# Patient Record
Sex: MALE | Race: BLACK OR AFRICAN AMERICAN | NOT HISPANIC OR LATINO | URBAN - METROPOLITAN AREA
[De-identification: names, ages, dates, MRNs, and addresses within clinical notes are randomized per-mention and may not be internally consistent; named-entity substitution may affect disease eponyms.]

---

## 2019-03-01 ENCOUNTER — OUTPATIENT (OUTPATIENT)
Dept: OUTPATIENT SERVICES | Facility: HOSPITAL | Age: 74
LOS: 1 days | End: 2019-03-01
Payer: MEDICAID

## 2019-03-01 PROCEDURE — G9001: CPT

## 2019-03-06 ENCOUNTER — INPATIENT (INPATIENT)
Facility: HOSPITAL | Age: 74
LOS: 1 days | Discharge: HOME | End: 2019-03-08
Attending: STUDENT IN AN ORGANIZED HEALTH CARE EDUCATION/TRAINING PROGRAM | Admitting: STUDENT IN AN ORGANIZED HEALTH CARE EDUCATION/TRAINING PROGRAM
Payer: MEDICAID

## 2019-03-06 VITALS
HEART RATE: 96 BPM | RESPIRATION RATE: 18 BRPM | DIASTOLIC BLOOD PRESSURE: 98 MMHG | SYSTOLIC BLOOD PRESSURE: 177 MMHG | TEMPERATURE: 98 F

## 2019-03-06 LAB
ALBUMIN SERPL ELPH-MCNC: 4.5 G/DL — SIGNIFICANT CHANGE UP (ref 3.3–5)
ALP SERPL-CCNC: 161 U/L — HIGH (ref 40–120)
ALT FLD-CCNC: 20 U/L — SIGNIFICANT CHANGE UP (ref 4–41)
ANION GAP SERPL CALC-SCNC: 12 MMO/L — SIGNIFICANT CHANGE UP (ref 7–14)
AST SERPL-CCNC: 21 U/L — SIGNIFICANT CHANGE UP (ref 4–40)
BASOPHILS # BLD AUTO: 0.02 K/UL — SIGNIFICANT CHANGE UP (ref 0–0.2)
BASOPHILS NFR BLD AUTO: 0.3 % — SIGNIFICANT CHANGE UP (ref 0–2)
BILIRUB SERPL-MCNC: < 0.2 MG/DL — LOW (ref 0.2–1.2)
BUN SERPL-MCNC: 19 MG/DL — SIGNIFICANT CHANGE UP (ref 7–23)
CALCIUM SERPL-MCNC: 9.5 MG/DL — SIGNIFICANT CHANGE UP (ref 8.4–10.5)
CHLORIDE SERPL-SCNC: 99 MMOL/L — SIGNIFICANT CHANGE UP (ref 98–107)
CO2 SERPL-SCNC: 30 MMOL/L — SIGNIFICANT CHANGE UP (ref 22–31)
CREAT SERPL-MCNC: 1.17 MG/DL — SIGNIFICANT CHANGE UP (ref 0.5–1.3)
EOSINOPHIL # BLD AUTO: 0.05 K/UL — SIGNIFICANT CHANGE UP (ref 0–0.5)
EOSINOPHIL NFR BLD AUTO: 0.8 % — SIGNIFICANT CHANGE UP (ref 0–6)
GLUCOSE SERPL-MCNC: 118 MG/DL — HIGH (ref 70–99)
HCT VFR BLD CALC: 43.9 % — SIGNIFICANT CHANGE UP (ref 39–50)
HGB BLD-MCNC: 14.2 G/DL — SIGNIFICANT CHANGE UP (ref 13–17)
IMM GRANULOCYTES NFR BLD AUTO: 0.3 % — SIGNIFICANT CHANGE UP (ref 0–1.5)
LYMPHOCYTES # BLD AUTO: 2.55 K/UL — SIGNIFICANT CHANGE UP (ref 1–3.3)
LYMPHOCYTES # BLD AUTO: 39.4 % — SIGNIFICANT CHANGE UP (ref 13–44)
MCHC RBC-ENTMCNC: 29.8 PG — SIGNIFICANT CHANGE UP (ref 27–34)
MCHC RBC-ENTMCNC: 32.3 % — SIGNIFICANT CHANGE UP (ref 32–36)
MCV RBC AUTO: 92 FL — SIGNIFICANT CHANGE UP (ref 80–100)
MONOCYTES # BLD AUTO: 0.62 K/UL — SIGNIFICANT CHANGE UP (ref 0–0.9)
MONOCYTES NFR BLD AUTO: 9.6 % — SIGNIFICANT CHANGE UP (ref 2–14)
NEUTROPHILS # BLD AUTO: 3.21 K/UL — SIGNIFICANT CHANGE UP (ref 1.8–7.4)
NEUTROPHILS NFR BLD AUTO: 49.6 % — SIGNIFICANT CHANGE UP (ref 43–77)
NRBC # FLD: 0 K/UL — LOW (ref 25–125)
PLATELET # BLD AUTO: 250 K/UL — SIGNIFICANT CHANGE UP (ref 150–400)
PMV BLD: 10.4 FL — SIGNIFICANT CHANGE UP (ref 7–13)
POTASSIUM SERPL-MCNC: 3.8 MMOL/L — SIGNIFICANT CHANGE UP (ref 3.5–5.3)
POTASSIUM SERPL-SCNC: 3.8 MMOL/L — SIGNIFICANT CHANGE UP (ref 3.5–5.3)
PROT SERPL-MCNC: 7.6 G/DL — SIGNIFICANT CHANGE UP (ref 6–8.3)
RBC # BLD: 4.77 M/UL — SIGNIFICANT CHANGE UP (ref 4.2–5.8)
RBC # FLD: 13 % — SIGNIFICANT CHANGE UP (ref 10.3–14.5)
SODIUM SERPL-SCNC: 141 MMOL/L — SIGNIFICANT CHANGE UP (ref 135–145)
TROPONIN T, HIGH SENSITIVITY: 15 NG/L — SIGNIFICANT CHANGE UP (ref ?–14)
WBC # BLD: 6.47 K/UL — SIGNIFICANT CHANGE UP (ref 3.8–10.5)
WBC # FLD AUTO: 6.47 K/UL — SIGNIFICANT CHANGE UP (ref 3.8–10.5)

## 2019-03-06 RX ORDER — SODIUM CHLORIDE 9 MG/ML
1000 INJECTION INTRAMUSCULAR; INTRAVENOUS; SUBCUTANEOUS ONCE
Qty: 0 | Refills: 0 | Status: COMPLETED | OUTPATIENT
Start: 2019-03-06 | End: 2019-03-06

## 2019-03-06 RX ORDER — METOCLOPRAMIDE HCL 10 MG
10 TABLET ORAL ONCE
Qty: 0 | Refills: 0 | Status: COMPLETED | OUTPATIENT
Start: 2019-03-06 | End: 2019-03-06

## 2019-03-06 RX ORDER — MECLIZINE HCL 12.5 MG
25 TABLET ORAL ONCE
Qty: 0 | Refills: 0 | Status: COMPLETED | OUTPATIENT
Start: 2019-03-06 | End: 2019-03-06

## 2019-03-06 RX ADMIN — Medication 25 MILLIGRAM(S): at 22:47

## 2019-03-06 RX ADMIN — Medication 10 MILLIGRAM(S): at 22:47

## 2019-03-06 RX ADMIN — SODIUM CHLORIDE 1000 MILLILITER(S): 9 INJECTION INTRAMUSCULAR; INTRAVENOUS; SUBCUTANEOUS at 22:47

## 2019-03-06 NOTE — ED ADULT TRIAGE NOTE - CHIEF COMPLAINT QUOTE
c/o dizziness since last night. AS per daughter his BP was 216/117 around 6pm. Took Hydrochlorothiazide 25 mg this morning. denies CP or SOB or nausea or any other associated symptoms.  PMH of HTN.

## 2019-03-06 NOTE — ED PROVIDER NOTE - CLINICAL SUMMARY MEDICAL DECISION MAKING FREE TEXT BOX
Elderly pt w/ new onset headache and dizziness, story without convincing peripheral elements, will obtain bloodwork including trops, ekg, ct head, if all studies WNL will likely MRI/MRA to r/o stroke given persistence of symptoms and lack of other story elements to explain symptoms

## 2019-03-06 NOTE — ED PROVIDER NOTE - ATTENDING CONTRIBUTION TO CARE
agree with resident note  "73y m w PMhx HTN on HCTZ at home p/w headache and intermittent dizziness since yesterday. Pt report frontal b/l headache since yesterday; he then began experiencing room spinning. Initially symptoms were persistent, now better when sitting still, worse when turning to either side. denies nausea, vomiting."  No prior hx of vertigo.  States symptoms started with a headache    PE: well appearing; VSS: PERRL; CTAB/L; s1 s2 no m/r/g abd soft/NT/ND exT: no edema Neuro: no nystagmus; no skew; gait stable; FTN wnl    Imp: concern given age and description of constant vertigo (though now resolved); will get CT CTA head/neck to help r/o vertibo/basilar insufficiency

## 2019-03-06 NOTE — ED PROVIDER NOTE - OBJECTIVE STATEMENT
73y m w PMhx HTN on HCTZ at home p/w headache and intermittent dizziness since yesterday. Pt report frontal b/l headache since yesterday; he then began experiencing room spi 73y m w PMhx HTN on HCTZ at home p/w headache and intermittent dizziness since yesterday. Pt report frontal b/l headache since yesterday; he then began experiencing room spinning. Initially symptoms were persistent, now better when sitting still, worse when turning to either side. denies nausea, vomiting.

## 2019-03-06 NOTE — ED ADULT NURSE NOTE - OBJECTIVE STATEMENT
74 yo AAO4, ambulatory, brought to room 6 c/o dizziness and high BP x 2 days. states he has been feeling intermittent dizziness since last night, pt daughter took his BP and it was "very high," associated with intermittent HA, pt appears comfortable in stretcher, VSS, gait even and steady, denies CP/SOB, blurry vision/ringing in ears. 20g placed in LAC, labs drawn and sent, medicated as per MD order, awaiting further orders at this time

## 2019-03-07 DIAGNOSIS — I10 ESSENTIAL (PRIMARY) HYPERTENSION: ICD-10-CM

## 2019-03-07 DIAGNOSIS — R42 DIZZINESS AND GIDDINESS: ICD-10-CM

## 2019-03-07 DIAGNOSIS — I72.9 ANEURYSM OF UNSPECIFIED SITE: ICD-10-CM

## 2019-03-07 DIAGNOSIS — Z29.9 ENCOUNTER FOR PROPHYLACTIC MEASURES, UNSPECIFIED: ICD-10-CM

## 2019-03-07 LAB
ANION GAP SERPL CALC-SCNC: 13 MMO/L — SIGNIFICANT CHANGE UP (ref 7–14)
BUN SERPL-MCNC: 15 MG/DL — SIGNIFICANT CHANGE UP (ref 7–23)
CALCIUM SERPL-MCNC: 9 MG/DL — SIGNIFICANT CHANGE UP (ref 8.4–10.5)
CHLORIDE SERPL-SCNC: 102 MMOL/L — SIGNIFICANT CHANGE UP (ref 98–107)
CHOLEST SERPL-MCNC: 192 MG/DL — SIGNIFICANT CHANGE UP (ref 120–199)
CK MB BLD-MCNC: 3.3 NG/ML — SIGNIFICANT CHANGE UP (ref 1–6.6)
CK SERPL-CCNC: 127 U/L — SIGNIFICANT CHANGE UP (ref 30–200)
CO2 SERPL-SCNC: 28 MMOL/L — SIGNIFICANT CHANGE UP (ref 22–31)
CREAT SERPL-MCNC: 1.22 MG/DL — SIGNIFICANT CHANGE UP (ref 0.5–1.3)
GLUCOSE SERPL-MCNC: 99 MG/DL — SIGNIFICANT CHANGE UP (ref 70–99)
HCT VFR BLD CALC: 42.7 % — SIGNIFICANT CHANGE UP (ref 39–50)
HDLC SERPL-MCNC: 33 MG/DL — LOW (ref 35–55)
HGB BLD-MCNC: 13.6 G/DL — SIGNIFICANT CHANGE UP (ref 13–17)
LIPID PNL WITH DIRECT LDL SERPL: 144 MG/DL — SIGNIFICANT CHANGE UP
MAGNESIUM SERPL-MCNC: 2.1 MG/DL — SIGNIFICANT CHANGE UP (ref 1.6–2.6)
MCHC RBC-ENTMCNC: 28.9 PG — SIGNIFICANT CHANGE UP (ref 27–34)
MCHC RBC-ENTMCNC: 31.9 % — LOW (ref 32–36)
MCV RBC AUTO: 90.9 FL — SIGNIFICANT CHANGE UP (ref 80–100)
NRBC # FLD: 0 K/UL — LOW (ref 25–125)
PHOSPHATE SERPL-MCNC: 2.8 MG/DL — SIGNIFICANT CHANGE UP (ref 2.5–4.5)
PLATELET # BLD AUTO: 229 K/UL — SIGNIFICANT CHANGE UP (ref 150–400)
PMV BLD: 10.7 FL — SIGNIFICANT CHANGE UP (ref 7–13)
POTASSIUM SERPL-MCNC: 4.1 MMOL/L — SIGNIFICANT CHANGE UP (ref 3.5–5.3)
POTASSIUM SERPL-SCNC: 4.1 MMOL/L — SIGNIFICANT CHANGE UP (ref 3.5–5.3)
RBC # BLD: 4.7 M/UL — SIGNIFICANT CHANGE UP (ref 4.2–5.8)
RBC # FLD: 13.1 % — SIGNIFICANT CHANGE UP (ref 10.3–14.5)
SODIUM SERPL-SCNC: 143 MMOL/L — SIGNIFICANT CHANGE UP (ref 135–145)
TRIGL SERPL-MCNC: 133 MG/DL — SIGNIFICANT CHANGE UP (ref 10–149)
TROPONIN T, HIGH SENSITIVITY: 25 NG/L — SIGNIFICANT CHANGE UP (ref ?–14)
TSH SERPL-MCNC: 2.63 UIU/ML — SIGNIFICANT CHANGE UP (ref 0.27–4.2)
WBC # BLD: 5.34 K/UL — SIGNIFICANT CHANGE UP (ref 3.8–10.5)
WBC # FLD AUTO: 5.34 K/UL — SIGNIFICANT CHANGE UP (ref 3.8–10.5)

## 2019-03-07 PROCEDURE — 99223 1ST HOSP IP/OBS HIGH 75: CPT

## 2019-03-07 PROCEDURE — 70551 MRI BRAIN STEM W/O DYE: CPT | Mod: 26

## 2019-03-07 PROCEDURE — 70498 CT ANGIOGRAPHY NECK: CPT | Mod: 26

## 2019-03-07 PROCEDURE — 99222 1ST HOSP IP/OBS MODERATE 55: CPT | Mod: GC

## 2019-03-07 PROCEDURE — 70496 CT ANGIOGRAPHY HEAD: CPT | Mod: 26

## 2019-03-07 PROCEDURE — 12345: CPT | Mod: NC

## 2019-03-07 RX ORDER — HYDRALAZINE HCL 50 MG
5 TABLET ORAL ONCE
Qty: 0 | Refills: 0 | Status: COMPLETED | OUTPATIENT
Start: 2019-03-07 | End: 2019-03-07

## 2019-03-07 RX ORDER — MECLIZINE HCL 12.5 MG
25 TABLET ORAL EVERY 6 HOURS
Qty: 0 | Refills: 0 | Status: DISCONTINUED | OUTPATIENT
Start: 2019-03-07 | End: 2019-03-08

## 2019-03-07 RX ORDER — INFLUENZA VIRUS VACCINE 15; 15; 15; 15 UG/.5ML; UG/.5ML; UG/.5ML; UG/.5ML
0.5 SUSPENSION INTRAMUSCULAR ONCE
Qty: 0 | Refills: 0 | Status: DISCONTINUED | OUTPATIENT
Start: 2019-03-07 | End: 2019-03-08

## 2019-03-07 RX ORDER — ATORVASTATIN CALCIUM 80 MG/1
40 TABLET, FILM COATED ORAL AT BEDTIME
Qty: 0 | Refills: 0 | Status: DISCONTINUED | OUTPATIENT
Start: 2019-03-07 | End: 2019-03-08

## 2019-03-07 RX ORDER — HEPARIN SODIUM 5000 [USP'U]/ML
5000 INJECTION INTRAVENOUS; SUBCUTANEOUS EVERY 12 HOURS
Qty: 0 | Refills: 0 | Status: DISCONTINUED | OUTPATIENT
Start: 2019-03-07 | End: 2019-03-08

## 2019-03-07 RX ORDER — ASPIRIN/CALCIUM CARB/MAGNESIUM 324 MG
81 TABLET ORAL DAILY
Qty: 0 | Refills: 0 | Status: DISCONTINUED | OUTPATIENT
Start: 2019-03-07 | End: 2019-03-08

## 2019-03-07 RX ORDER — HYDRALAZINE HCL 50 MG
5 TABLET ORAL ONCE
Qty: 0 | Refills: 0 | Status: DISCONTINUED | OUTPATIENT
Start: 2019-03-07 | End: 2019-03-07

## 2019-03-07 RX ADMIN — HEPARIN SODIUM 5000 UNIT(S): 5000 INJECTION INTRAVENOUS; SUBCUTANEOUS at 05:11

## 2019-03-07 RX ADMIN — Medication 81 MILLIGRAM(S): at 05:11

## 2019-03-07 RX ADMIN — Medication 5 MILLIGRAM(S): at 21:34

## 2019-03-07 RX ADMIN — ATORVASTATIN CALCIUM 40 MILLIGRAM(S): 80 TABLET, FILM COATED ORAL at 21:34

## 2019-03-07 RX ADMIN — Medication 81 MILLIGRAM(S): at 14:37

## 2019-03-07 RX ADMIN — HEPARIN SODIUM 5000 UNIT(S): 5000 INJECTION INTRAVENOUS; SUBCUTANEOUS at 18:48

## 2019-03-07 NOTE — PROGRESS NOTE ADULT - PROBLEM SELECTOR PLAN 1
Differential include BPPV (patient with resolution of symptoms after getting Meclizine) vs possible vertebrobasilar CVA  Monitor on telemetry to r/o any underline arrhythmia   Labs are unremarkable  House neurology following   MRI head w/o ordered  TTE with bubble study ordered - If both are unremarkable, will discharge the patient   Started on Aspirin 81mg and Lipitor 40mg QHS daily for now   Seizure, fall and aspiration precautions   Elevate head of the bed to 30 degree  PT/OT ordered   Speech and swallow, placed on dysphagia puree diet   Neuro checks q4hrs

## 2019-03-07 NOTE — CONSULT NOTE ADULT - PROBLEM SELECTOR RECOMMENDATION 9
likely peripheral, now resolving.   Plan:   -c/w supportive treatment  -vestibular therapy outpatient if needed   -c/w secondary stroke prevention w/ risk factor control

## 2019-03-07 NOTE — OCCUPATIONAL THERAPY INITIAL EVALUATION ADULT - PERTINENT HX OF CURRENT PROBLEM, REHAB EVAL
73 y.o. Male with PMH of HTN presented with the complaint of headache and dizziness since yesterday. R/o CVA

## 2019-03-07 NOTE — H&P ADULT - NSHPPHYSICALEXAM_GEN_ALL_CORE
Vital Signs Last 24 Hrs  T(C): 36.7 (07 Mar 2019 05:17), Max: 36.8 (06 Mar 2019 22:02)  T(F): 98.1 (07 Mar 2019 05:17), Max: 98.2 (06 Mar 2019 22:02)  HR: 77 (07 Mar 2019 05:17) (77 - 96)  BP: 170/80 (07 Mar 2019 05:17) (144/80 - 190/90)  BP(mean): --  RR: 18 (07 Mar 2019 05:17) (18 - 19)  SpO2: 100% (07 Mar 2019 05:17) (99% - 100%)

## 2019-03-07 NOTE — H&P ADULT - NEGATIVE MUSCULOSKELETAL SYMPTOMS
no arthralgia/no joint swelling/no muscle weakness/no myalgia/no muscle cramps/no stiffness/no neck pain/no arthritis

## 2019-03-07 NOTE — H&P ADULT - RS GEN PE MLT RESP DETAILS PC
no intercostal retractions/no rales/respirations non-labored/no wheezes/breath sounds equal/good air movement/no chest wall tenderness/no rhonchi/normal/airway patent/clear to auscultation bilaterally

## 2019-03-07 NOTE — H&P ADULT - PROBLEM SELECTOR PLAN 1
Differential include BPPV(patient with resolution of symptoms after getting Meclizine) vs possible vertebrobasilar CVA  Monitor on telemetry to r/o any underline arrhythmia   HgbA1C, TSH, lipid profile, CBC, CMP in am   House neurology following and awaiting their recommendation   MRI head w/o ordered   TTE with bubble study ordered   Started on Aspirin 81mg and Lipitor 40mg QHS daily for now   Seizure, fall and aspiration precautions   Elevate head of the bed to 30 degree  PT/OT ordered   Speech and swallow, placed on dysphagia puree diet   Neuro checks q4hrs Differential include BPPV (patient with resolution of symptoms after getting Meclizine) vs possible vertebrobasilar CVA  Monitor on telemetry to r/o any underline arrhythmia   HgbA1C, TSH, lipid profile, CBC, CMP in am   House neurology following and awaiting their recommendation   MRI head w/o ordered   TTE with bubble study ordered   Started on Aspirin 81mg and Lipitor 40mg QHS daily for now   Seizure, fall and aspiration precautions   Elevate head of the bed to 30 degree  PT/OT ordered   Speech and swallow, placed on dysphagia puree diet   Neuro checks q4hrs

## 2019-03-07 NOTE — H&P ADULT - NEGATIVE GASTROINTESTINAL SYMPTOMS
no nausea/no melena/no vomiting/no constipation/no change in bowel habits/no abdominal pain/no hematochezia/no diarrhea

## 2019-03-07 NOTE — CONSULT NOTE ADULT - ATTENDING COMMENTS
Patient seen and examined with neurology team and above note reviewed and I agree with assessment and plan as outlined. Patient reports that he is feeling better with regards to the vertigo and dizziness that began yesterday upon awakening and he denies any new associated neurologic symptoms. Exam is nonfocal and CT head negative and CTA of head and neck showed a small incidental aneruysm which will be followed outpatient by neurosurgery.  Continue LESLEY control and if symptoms recur will obtain MRI brain without contrast.  Continue medical and symptomatic management, PT evaluation and all questions answered with daughter at bedside.

## 2019-03-07 NOTE — H&P ADULT - NSHPLABSRESULTS_GEN_ALL_CORE
14.2   6.47  )-----------( 250      ( 06 Mar 2019 22:40 )             43.9     03-06    141  |  99  |  19  ----------------------------<  118<H>  3.8   |  30  |  1.17    Ca    9.5      06 Mar 2019 22:40    TPro  7.6  /  Alb  4.5  /  TBili  < 0.2<L>  /  DBili  x   /  AST  21  /  ALT  20  /  AlkPhos  161<H>  03-06    CT angiography neck: No hemodynamically significant stenosis. No evidence of vertebral basilar insufficiency.    CT angiography brain: The [place] is patent. 4 mm sessile left supraclinoid aneurysm.    EKG: Sinus rhythm with PAC at a rate of 94 with QTc of 455 14.2   6.47  )-----------( 250      ( 06 Mar 2019 22:40 )             43.9     03-06    141  |  99  |  19  ----------------------------<  118<H>  3.8   |  30  |  1.17    Ca    9.5      06 Mar 2019 22:40    TPro  7.6  /  Alb  4.5  /  TBili  < 0.2<L>  /  DBili  x   /  AST  21  /  ALT  20  /  AlkPhos  161<H>  03-06    CT angiography neck: No hemodynamically significant stenosis. No evidence of vertebral basilar insufficiency.    CT angiography brain: The [place] of [place] is patent. 4 mm sessile left supraclinoid aneurysm.    EKG personally reviewed, Sinus rhythm with PAC at a rate of 94 with QTc of 455

## 2019-03-07 NOTE — H&P ADULT - NSHPSOCIALHISTORY_GEN_ALL_CORE
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

## 2019-03-07 NOTE — CONSULT NOTE ADULT - ASSESSMENT
74 yo Right handed AA man who presents to [facility] w/ 1 day onset of intermittent (room spinning dizziness w/ associated bifrontal headache. Dizziness is positional, improves when sitting still, worse with head movements. ROS otherwise unremarkable for acute vision changes, fever, chills, LOC, chest pain, SOB, abdominal pain,  N/V, loss of bowel/bladder, focal weakness, numbness, gait abnormalities. Pertinent hx includes HTN. NIHSS 0 MRS 0. Patient currently asymptomatic     Exam non-focal, patient asymptomatic at this time.     CTH/CTA H/N reviewed, no bleed, mass, or large infarct. CTA Brain is suspicious for 4mm sessile left supraclinoid aneurysm

## 2019-03-07 NOTE — H&P ADULT - NEGATIVE NEUROLOGICAL SYMPTOMS
no difficulty walking/no paresthesias/no loss of sensation/no loss of consciousness/no confusion/no focal seizures/no transient paralysis/no syncope/no generalized seizures/no tremors/no hemiparesis/no weakness

## 2019-03-07 NOTE — H&P ADULT - GASTROINTESTINAL DETAILS
no rebound tenderness/no guarding/soft/no distention/nontender/bowel sounds normal/no rigidity/normal

## 2019-03-07 NOTE — H&P ADULT - NEGATIVE CARDIOVASCULAR SYMPTOMS
no paroxysmal nocturnal dyspnea/no palpitations/no dyspnea on exertion/no peripheral edema/no chest pain/no orthopnea

## 2019-03-07 NOTE — H&P ADULT - NEGATIVE OPHTHALMOLOGIC SYMPTOMS
no discharge R/no diplopia/no photophobia/no lacrimation L/no lacrimation R/no discharge L/no blurred vision L/no blurred vision R

## 2019-03-07 NOTE — H&P ADULT - PROBLEM SELECTOR PLAN 2
Consider slowly gradual normotension as symptoms started 2 days ago. Will hold off on starting home HCTZ as patient received IV contrast   DASH diet recommended Slow gradual normotension as symptoms started 2 days ago. Will hold off on starting home HCTZ as patient received IV contrast   DASH diet recommended

## 2019-03-07 NOTE — PROGRESS NOTE ADULT - SUBJECTIVE AND OBJECTIVE BOX
Patient is a 73y old  Male who presents with a chief complaint of Headache and dizziness (07 Mar 2019 04:01)      SUBJECTIVE / OVERNIGHT EVENTS:  Patient seen and examined this morning. Reports that his dizziness/vertigo symptoms have subsided. Denies any chest pain, shortness of breath, fevers, chills, nausea or vomiting.      MEDICATIONS  (STANDING):  aspirin enteric coated 81 milliGRAM(s) Oral daily  atorvastatin 40 milliGRAM(s) Oral at bedtime  heparin  Injectable 5000 Unit(s) SubCutaneous every 12 hours    MEDICATIONS  (PRN):  meclizine 25 milliGRAM(s) Oral every 6 hours PRN Dizziness      PHYSICAL EXAM:  T(C): 36.5 (03-07-19 @ 09:18), Max: 36.8 (03-06-19 @ 22:02)  HR: 95 (03-07-19 @ 09:18) (77 - 96)  BP: 180/96 (03-07-19 @ 09:18) (144/80 - 190/90)  RR: 18 (03-07-19 @ 09:18) (17 - 19)  SpO2: 100% (03-07-19 @ 09:18) (99% - 100%)  I&O's Summary    GENERAL: NAD, well-developed  HEAD:  Atraumatic, Normocephalic  EYES: EOMI, PERRLA, conjunctiva and sclera clear  NECK: Supple, No elevated JVD  CHEST/LUNG: Clear to auscultation bilaterally; No wheeze  HEART: Regular rate and rhythm; No murmurs, rubs, or gallops  ABDOMEN: Soft, Nontender, Nondistended; Bowel sounds present  EXTREMITIES:  2+ Peripheral Pulses, No clubbing, cyanosis, or edema  PSYCH: AAOx3  NEUROLOGY: CN II-XII grossly intact, moving all extremities  SKIN: No rashes or lesions    LABS:  CAPILLARY BLOOD GLUCOSE                              13.6   5.34  )-----------( 229      ( 07 Mar 2019 05:10 )             42.7     03-07    143  |  102  |  15  ----------------------------<  99  4.1   |  28  |  1.22    Ca    9.0      07 Mar 2019 05:10  Phos  2.8     03-07  Mg     2.1     03-07    TPro  7.6  /  Alb  4.5  /  TBili  < 0.2<L>  /  DBili  x   /  AST  21  /  ALT  20  /  AlkPhos  161<H>  03-06      CARDIAC MARKERS ( 07 Mar 2019 05:10 )  x     / x     / 127 u/L / 3.30 ng/mL / x              RADIOLOGY & ADDITIONAL TESTS:    Imaging Personally Reviewed:    Consultant(s) Notes Reviewed:  Neuro consult appreciated    Care Discussed with Consultants/Other Providers:

## 2019-03-07 NOTE — CONSULT NOTE ADULT - PROBLEM SELECTOR RECOMMENDATION 2
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   -no intervention at this time, observe for now.

## 2019-03-07 NOTE — H&P ADULT - NEGATIVE ENMT SYMPTOMS
no nasal congestion/no tinnitus/no nasal discharge/no hearing difficulty/no ear pain/no sinus symptoms

## 2019-03-07 NOTE — H&P ADULT - HISTORY OF PRESENT ILLNESS
72 y/o M with PMH of HTN presented with the complaint of headache and dizziness since yesterday. As per the patient he developed frontal headache yesterday when he was at rest. Patient stated that the frontal headache came on suddenly, characterized as a pressure like sensation, without any aggravating or alleviating factors, without any radiation of the headache, and unable to give severity of the headache. Patient stated that yesterday with the headache he also had dizziness and described it as the room is spinning. Patient stated that nothing would aggravate the dizziness and would just occur if he is walking or sitting. Patient stated that the dizziness would last about 10 minutes and then self resolve. Patient stated that he has never had headache or dizziness in the past and this concerned his family and he was brought to the ER. Patient stated that when he developed the headache his daughter took his blood pressure but he does not remember if it was high or normal. Patient denied any CP, SOB, fevers, chills, N/V/D/C, abdominal pain, dysuria, melena, hematochezia, recent travel, sick contact, pleuritic or positional chest pain.     On ED admission EKG revealed Sinus rhythm with PAC at a rate of 94 with QTc of 455, CE x 1: Trop: 15, Gluc: 118, Alk Phos: 161. CT angiography neck: No hemodynamically significant stenosis. No evidence of vertebral basilar insufficiency. CT angiography brain: The [place] is patent. 4 mm sessile left supraclinoid aneurysm. In the ED patient received IV Reglan and PO Meclizine. When examined patient is resting in the stretcher and denied any current headaches or dizziness.

## 2019-03-07 NOTE — ED ADULT NURSE REASSESSMENT NOTE - NS ED NURSE REASSESS COMMENT FT1
pt resting comfortably in stretcher, NSR on monitor, VSS, denies pain at this time, will monitor
Patient received from previous shift in NAD, v/s as noted. A&Ox3  report endorsed to U boarder.

## 2019-03-07 NOTE — PROGRESS NOTE ADULT - PROBLEM SELECTOR PLAN 2
Slow gradual normotension as symptoms started 2 days ago. Will hold off on starting home HCTZ as patient received IV contrast   DASH diet recommended

## 2019-03-07 NOTE — PROGRESS NOTE ADULT - ASSESSMENT
72 y/o M with PMH of HTN presented with the complaint of headache and dizziness since yesterday. R/o CVA

## 2019-03-07 NOTE — CONSULT NOTE ADULT - SUBJECTIVE AND OBJECTIVE BOX
Neurology Consult    Name: GEORGE MENDOZA    HPI: 72 yo....man who presents to [facility] w/ 1 day onset of intermittent (room spinning dizziness w/ associated bifrontal headache. Dizziness is positional, improves when sitting still, worse with head movements. ROS otherwise unremarkable for acute vision changes, fever, chills, LOC, chest pain, SOB, abdominal pain,  N/V, loss of bowel/bladder, focal weakness, numbness, gait abnormalities. Pertinent hx includes HTN. NIHSS MRS 0.     PMH/PSH:   HTN    MEDICATIONS  (home)     Allergies  No Known Allergies    Objective:   Vital Signs Last 24 Hrs  T(C): 36.6 (07 Mar 2019 01:23), Max: 36.8 (06 Mar 2019 22:02)  T(F): 97.8 (07 Mar 2019 01:23), Max: 98.2 (06 Mar 2019 22:02)  HR: 77 (07 Mar 2019 01:23) (77 - 96)  BP: 165/85 (07 Mar 2019 01:23) (144/80 - 190/90)  RR: 19 (07 Mar 2019 01:23) (18 - 19)  SpO2: 99% (07 Mar 2019 01:23) (99% - 99%)    General Exam:   General appearance: No acute distress                   Neurological Exam:  Mental Status: AAOx3, fluent speech, follows commands    Cranial Nerves: EOMI, PERRL, V1-V3 intact, facial symmetry intact, no dysarthria, tongue midline, VFF    Motor: 5/5 throughout. No drift x4    Sensation: Intact to LT throughout    Coordination: FTN intact b/l    Reflexes: 1+ bilateral biceps, brachioradialis, patellar and ankle    Gait: normal and stable.      Labs:    03-06    141  |  99  |  19  ----------------------------<  118<H>  3.8   |  30  |  1.17    Ca    9.5      06 Mar 2019 22:40    TPro  7.6  /  Alb  4.5  /  TBili  < 0.2<L>  /  DBili  x   /  AST  21  /  ALT  20  /  AlkPhos  161<H>  03-06    LIVER FUNCTIONS - ( 06 Mar 2019 22:40 )  Alb: 4.5 g/dL / Pro: 7.6 g/dL / ALK PHOS: 161 u/L / ALT: 20 u/L / AST: 21 u/L / GGT: x           CBC Full  -  ( 06 Mar 2019 22:40 )  WBC Count : 6.47 K/uL  Hemoglobin : 14.2 g/dL  Hematocrit : 43.9 %  Platelet Count - Automated : 250 K/uL  Mean Cell Volume : 92.0 fL  Mean Cell Hemoglobin : 29.8 pg  Mean Cell Hemoglobin Concentration : 32.3 %  Auto Neutrophil # : 3.21 K/uL  Auto Lymphocyte # : 2.55 K/uL  Auto Monocyte # : 0.62 K/uL  Auto Eosinophil # : 0.05 K/uL  Auto Basophil # : 0.02 K/uL  Auto Neutrophil % : 49.6 %  Auto Lymphocyte % : 39.4 %  Auto Monocyte % : 9.6 %  Auto Eosinophil % : 0.8 %  Auto Basophil % : 0.3 %    Radiology  < from: CT Angio Neck w/ IV Cont (03.07.19 @ 00:59) >  IMPRESSION:   CT angiography neck: No hemodynamically significant stenosis. No evidence   of vertebral basilar insufficiency.    CT angiography brain: The [place] of [place] is patent. 4 mm sessile left   supraclinoid aneurysm.    < end of copied text > Neurology Consult    Name: GEORGE MENDOZA    HPI: 74 yo Right handed AA man who presents to [facility] w/ 1 day onset of intermittent (room spinning dizziness w/ associated bifrontal headache. Dizziness is positional, improves when sitting still, worse with head movements. ROS otherwise unremarkable for acute vision changes, fever, chills, LOC, chest pain, SOB, abdominal pain,  N/V, loss of bowel/bladder, focal weakness, numbness, gait abnormalities. Pertinent hx includes HTN. NIHSS 0 MRS 0. Patient currently asymptomatic     PMH/PSH:   HTN    MEDICATIONS  (home)     Allergies  No Known Allergies    Objective:   Vital Signs Last 24 Hrs  T(C): 36.6 (07 Mar 2019 01:23), Max: 36.8 (06 Mar 2019 22:02)  T(F): 97.8 (07 Mar 2019 01:23), Max: 98.2 (06 Mar 2019 22:02)  HR: 77 (07 Mar 2019 01:23) (77 - 96)  BP: 165/85 (07 Mar 2019 01:23) (144/80 - 190/90)  RR: 19 (07 Mar 2019 01:23) (18 - 19)  SpO2: 99% (07 Mar 2019 01:23) (99% - 99%)    General Exam:   General appearance: No acute distress                   Neurological Exam:  Mental Status: AAOx3, fluent speech, follows commands    Cranial Nerves: EOMI no nystagmus no diplopia, PERRL, V1-V3 intact, facial symmetry intact, no dysarthria, tongue midline, VFF    Motor: 5/5 throughout. No drift x4    Sensation: Intact to LT throughout    Coordination: FTN intact b/l    Reflexes: toes downgoing b/l.     Gait: not assessed     Other: Head Impulse test normal     Labs:    03-06    141  |  99  |  19  ----------------------------<  118<H>  3.8   |  30  |  1.17    Ca    9.5      06 Mar 2019 22:40    TPro  7.6  /  Alb  4.5  /  TBili  < 0.2<L>  /  DBili  x   /  AST  21  /  ALT  20  /  AlkPhos  161<H>  03-06    LIVER FUNCTIONS - ( 06 Mar 2019 22:40 )  Alb: 4.5 g/dL / Pro: 7.6 g/dL / ALK PHOS: 161 u/L / ALT: 20 u/L / AST: 21 u/L / GGT: x           CBC Full  -  ( 06 Mar 2019 22:40 )  WBC Count : 6.47 K/uL  Hemoglobin : 14.2 g/dL  Hematocrit : 43.9 %  Platelet Count - Automated : 250 K/uL  Mean Cell Volume : 92.0 fL  Mean Cell Hemoglobin : 29.8 pg  Mean Cell Hemoglobin Concentration : 32.3 %  Auto Neutrophil # : 3.21 K/uL  Auto Lymphocyte # : 2.55 K/uL  Auto Monocyte # : 0.62 K/uL  Auto Eosinophil # : 0.05 K/uL  Auto Basophil # : 0.02 K/uL  Auto Neutrophil % : 49.6 %  Auto Lymphocyte % : 39.4 %  Auto Monocyte % : 9.6 %  Auto Eosinophil % : 0.8 %  Auto Basophil % : 0.3 %    Radiology  < from: CT Angio Neck w/ IV Cont (03.07.19 @ 00:59) >  IMPRESSION:   CT angiography neck: No hemodynamically significant stenosis. No evidence   of vertebral basilar insufficiency.    CT angiography brain: The [place] is patent. 4 mm sessile left   supraclinoid aneurysm.    < end of copied text >

## 2019-03-08 VITALS — WEIGHT: 139.99 LBS

## 2019-03-08 DIAGNOSIS — Z71.89 OTHER SPECIFIED COUNSELING: ICD-10-CM

## 2019-03-08 LAB
ALBUMIN SERPL ELPH-MCNC: 3.7 G/DL — SIGNIFICANT CHANGE UP (ref 3.3–5)
ALP SERPL-CCNC: 89 U/L — SIGNIFICANT CHANGE UP (ref 40–120)
ALT FLD-CCNC: 14 U/L — SIGNIFICANT CHANGE UP (ref 4–41)
ANION GAP SERPL CALC-SCNC: 11 MMO/L — SIGNIFICANT CHANGE UP (ref 7–14)
AST SERPL-CCNC: 18 U/L — SIGNIFICANT CHANGE UP (ref 4–40)
BILIRUB SERPL-MCNC: 0.4 MG/DL — SIGNIFICANT CHANGE UP (ref 0.2–1.2)
BUN SERPL-MCNC: 11 MG/DL — SIGNIFICANT CHANGE UP (ref 7–23)
CALCIUM SERPL-MCNC: 9.2 MG/DL — SIGNIFICANT CHANGE UP (ref 8.4–10.5)
CHLORIDE SERPL-SCNC: 103 MMOL/L — SIGNIFICANT CHANGE UP (ref 98–107)
CO2 SERPL-SCNC: 27 MMOL/L — SIGNIFICANT CHANGE UP (ref 22–31)
CREAT SERPL-MCNC: 1.03 MG/DL — SIGNIFICANT CHANGE UP (ref 0.5–1.3)
GLUCOSE SERPL-MCNC: 90 MG/DL — SIGNIFICANT CHANGE UP (ref 70–99)
HBA1C BLD-MCNC: 5.9 % — HIGH (ref 4–5.6)
HCT VFR BLD CALC: 43 % — SIGNIFICANT CHANGE UP (ref 39–50)
HGB BLD-MCNC: 13.8 G/DL — SIGNIFICANT CHANGE UP (ref 13–17)
MAGNESIUM SERPL-MCNC: 2 MG/DL — SIGNIFICANT CHANGE UP (ref 1.6–2.6)
MCHC RBC-ENTMCNC: 29.6 PG — SIGNIFICANT CHANGE UP (ref 27–34)
MCHC RBC-ENTMCNC: 32.1 % — SIGNIFICANT CHANGE UP (ref 32–36)
MCV RBC AUTO: 92.1 FL — SIGNIFICANT CHANGE UP (ref 80–100)
NRBC # FLD: 0 K/UL — LOW (ref 25–125)
PHOSPHATE SERPL-MCNC: 3.3 MG/DL — SIGNIFICANT CHANGE UP (ref 2.5–4.5)
PLATELET # BLD AUTO: 222 K/UL — SIGNIFICANT CHANGE UP (ref 150–400)
PMV BLD: 10.7 FL — SIGNIFICANT CHANGE UP (ref 7–13)
POTASSIUM SERPL-MCNC: 3.8 MMOL/L — SIGNIFICANT CHANGE UP (ref 3.5–5.3)
POTASSIUM SERPL-SCNC: 3.8 MMOL/L — SIGNIFICANT CHANGE UP (ref 3.5–5.3)
PROT SERPL-MCNC: 6.3 G/DL — SIGNIFICANT CHANGE UP (ref 6–8.3)
RBC # BLD: 4.67 M/UL — SIGNIFICANT CHANGE UP (ref 4.2–5.8)
RBC # FLD: 13.2 % — SIGNIFICANT CHANGE UP (ref 10.3–14.5)
SODIUM SERPL-SCNC: 141 MMOL/L — SIGNIFICANT CHANGE UP (ref 135–145)
WBC # BLD: 4.35 K/UL — SIGNIFICANT CHANGE UP (ref 3.8–10.5)
WBC # FLD AUTO: 4.35 K/UL — SIGNIFICANT CHANGE UP (ref 3.8–10.5)

## 2019-03-08 PROCEDURE — 93306 TTE W/DOPPLER COMPLETE: CPT | Mod: 26

## 2019-03-08 PROCEDURE — 99239 HOSP IP/OBS DSCHRG MGMT >30: CPT

## 2019-03-08 RX ORDER — MECLIZINE HCL 12.5 MG
1 TABLET ORAL
Qty: 120 | Refills: 0 | OUTPATIENT
Start: 2019-03-08 | End: 2019-04-06

## 2019-03-08 RX ORDER — ATORVASTATIN CALCIUM 80 MG/1
1 TABLET, FILM COATED ORAL
Qty: 30 | Refills: 0 | OUTPATIENT
Start: 2019-03-08 | End: 2019-04-06

## 2019-03-08 RX ORDER — ASPIRIN/CALCIUM CARB/MAGNESIUM 324 MG
1 TABLET ORAL
Qty: 0 | Refills: 0 | COMMUNITY
Start: 2019-03-08

## 2019-03-08 RX ADMIN — Medication 81 MILLIGRAM(S): at 13:54

## 2019-03-08 RX ADMIN — HEPARIN SODIUM 5000 UNIT(S): 5000 INJECTION INTRAVENOUS; SUBCUTANEOUS at 06:11

## 2019-03-08 NOTE — DISCHARGE NOTE PROVIDER - INSTRUCTIONS
Continue diet modification. Avoid complex carbohydrates such as bread, pasta, cereal, white rice, white potatoes, etc. Avoid concentrated sugar as found in desserts, candy, soda, juice, etc. Consume a diet based on lean protein (chicken, fish) and vegetables.    Low salt, low fat diet.

## 2019-03-08 NOTE — DIETITIAN INITIAL EVALUATION ADULT. - ADHERENCE
No diet restrictions followed PTA. Patient reported he does not monitor BGs at home. 3/9 Hba1c: 5.9%

## 2019-03-08 NOTE — DIETITIAN INITIAL EVALUATION ADULT. - PERTINENT MEDS FT
MEDICATIONS  (STANDING):  aspirin enteric coated 81 milliGRAM(s) Oral daily  atorvastatin 40 milliGRAM(s) Oral at bedtime  heparin  Injectable 5000 Unit(s) SubCutaneous every 12 hours  influenza   Vaccine 0.5 milliLiter(s) IntraMuscular once    MEDICATIONS  (PRN):  meclizine 25 milliGRAM(s) Oral every 6 hours PRN Dizziness

## 2019-03-08 NOTE — DISCHARGE NOTE PROVIDER - NSDCCPCAREPLAN_GEN_ALL_CORE_FT
PRINCIPAL DISCHARGE DIAGNOSIS  Problem: Vertigo  Assessment and Plan of Treatment: Continue meclizine as needed for dizziness.  Follow up with your PCP within 1-2 weeks; call for appointment.   Follow up with neuro within 1 month; call for appointment.   Your MRI was negative for stroke.      SECONDARY DISCHARGE DIAGNOSES  Problem: Aneurysm  Assessment and Plan of Treatment: Follow up with neurology within 1 month for ongoing monitoring; call for appointment.  Continue medications as prescribed.    Problem: Essential hypertension  Assessment and Plan of Treatment: Low sodium and fat diet, continue anti-hypertensive medications, and follow up with primary care physician.    Problem: Pre-diabetes  Assessment and Plan of Treatment: Continue diet modification. Avoid complex carbohydrates such as bread, pasta, cereal, white rice, white potatoes, etc. Avoid concentrated sugar as found in desserts, candy, soda, juice, etc. Consume a diet based on lean protein (chicken, fish) and vegetables.  Follow up with your PCP for repeat HgA1C blood test in 3 months; call for appointment.

## 2019-03-08 NOTE — DIETITIAN INITIAL EVALUATION ADULT. - NS AS NUTRI INTERV ED CONTENT
RDN reviewed DM diet education; including, carb counting, label reading, meal planning, pre-prandial and post-prandial finger stick goals, and HbA1c goal. Patient was also made aware of the physiological implications of poor glycemic control. The Patient was provided with Heart Healthy diet education (low sodium, low cholesterol, "good fats" vs "bad fats," fiber, label reading, meal planning, and daily weight monitoring).

## 2019-03-08 NOTE — DIETITIAN INITIAL EVALUATION ADULT. - PERTINENT LABORATORY DATA
03-08 Na141 mmol/L Glu 90 mg/dL K+ 3.8 mmol/L Cr  1.03 mg/dL BUN 11 mg/dL 03-08 Phos 3.3 mg/dL 03-08 Alb 3.7 g/dL 03-08 [id number] 5.9 %<H> 03-07 Chol 192 mg/dL  mg/dL HDL 33 mg/dL<L> Trig 133 mg/dL

## 2019-03-08 NOTE — PROGRESS NOTE ADULT - ASSESSMENT
72 y/o M with PMH of HTN presented with the complaint of headache and dizziness since yesterday. admit for rule-out CVA. CTH/CTA was unremarkable except for incidental aneurysm which require outpatient follow up. MRI head showed no acute infarct. Today symptoms had completely resolved. like acute vertigo.

## 2019-03-08 NOTE — PROGRESS NOTE ADULT - SUBJECTIVE AND OBJECTIVE BOX
Patient is a 73y old  Male who presents with a chief complaint of Headache and dizziness (08 Mar 2019 14:21)      SUBJECTIVE / OVERNIGHT EVENTS:  no events overnight. no new complaints today.    Review of Systems:   Gen: no fever, no chill  Pulm: no cough, no SOB  Card: no chest pain, no palpation  Abd: no abd pain; no diarrhea  Neuro: no dizziness  Ext: no joint pain, no swelling     MEDICATIONS  (STANDING):  aspirin enteric coated 81 milliGRAM(s) Oral daily  atorvastatin 40 milliGRAM(s) Oral at bedtime  heparin  Injectable 5000 Unit(s) SubCutaneous every 12 hours  influenza   Vaccine 0.5 milliLiter(s) IntraMuscular once    MEDICATIONS  (PRN):  meclizine 25 milliGRAM(s) Oral every 6 hours PRN Dizziness      PHYSICAL EXAM:  T(C): 37.1 (03-08-19 @ 06:10), Max: 37.1 (03-08-19 @ 06:10)  HR: 85 (03-08-19 @ 11:09) (73 - 85)  BP: 146/84 (03-08-19 @ 11:09) (146/73 - 171/102)  RR: 17 (03-08-19 @ 11:09) (16 - 18)  SpO2: 100% (03-08-19 @ 11:09) (98% - 100%)  I&O's Summary    Gen: NAD; resting in bed;  Pulm: no respiratory distress; CTA b/l; no wheezing  Card: RRR; S1/S2 wnl; no obvious murmurs  Abd: soft; +bs; NT/ND  Ext: no joint swelling; no edema  Neuro: no FND on exam, CN 2-12 grossly wnl.   Devices: no corrales, no central line       LABS:  CAPILLARY BLOOD GLUCOSE                              13.8   4.35  )-----------( 222      ( 08 Mar 2019 06:15 )             43.0     03-08    141  |  103  |  11  ----------------------------<  90  3.8   |  27  |  1.03    Ca    9.2      08 Mar 2019 06:15  Phos  3.3     03-08  Mg     2.0     03-08    TPro  6.3  /  Alb  3.7  /  TBili  0.4  /  DBili  x   /  AST  18  /  ALT  14  /  AlkPhos  89  03-08      CARDIAC MARKERS ( 07 Mar 2019 05:10 )  x     / x     / 127 u/L / 3.30 ng/mL / x              RADIOLOGY & ADDITIONAL TESTS:    Imaging Personally Reviewed:  < from: MR Head No Cont (03.07.19 @ 20:30) >  IMPRESSION:    Moderate microvascular type white matter changes.    No evidence of acute cerebral ischemia or other acute intracranial   abnormality.    < end of copied text >    Consultant(s) Notes Reviewed:      Care Discussed with Consultants/Other Providers:

## 2019-03-08 NOTE — PROGRESS NOTE ADULT - PROBLEM SELECTOR PLAN 1
Differential include BPPV (patient with resolution of symptoms after getting Meclizine) vs possible vertebrobasilar CVA  Monitor on telemetry to r/o any underline arrhythmia   Labs are unremarkable  House neurology following   MRI head w/o acute abnormalities. no need for TTE  will discharge the patient today  c/w Aspirin 81mg and Lipitor 40mg QHS daily for now   PTeval - no needs

## 2019-03-08 NOTE — DIETITIAN INITIAL EVALUATION ADULT. - ORAL INTAKE PTA
Patient reported having an okay appetite - consumes about 3 meals a day- usual food recall oatmeal, rice, peas, fish. Patient reported he does not eat meat/ eggs, accepts dairy and fish.

## 2019-03-08 NOTE — DIETITIAN INITIAL EVALUATION ADULT. - DIET TYPE
dysphagia 1, pureed, honey consistency fluid/DASH/TLC (sodium and cholesterol restricted diet)/consistent carbohydrate (no snacks)/No caffeine, No carbonated beverages, no chocolate/low sodium

## 2019-03-08 NOTE — DISCHARGE NOTE PROVIDER - CARE PROVIDER_API CALL
Jitendra Gongora (DO)  Neurology  [address], [address]  [place] [address]  Phone: [phone]  Fax: [phone]  Follow Up Time:

## 2019-03-08 NOTE — CHART NOTE - NSCHARTNOTEFT_GEN_A_CORE
MRI reviewed - no acute findings  No further inpatient neurologic workup recommended    Discussed with Dr Fransisca Dial  PG2 Neurology Resident

## 2019-03-08 NOTE — DISCHARGE NOTE PROVIDER - HOSPITAL COURSE
HPI:    74 y/o M with PMH of HTN presented with the complaint of headache and dizziness since yesterday. As per the patient he developed frontal headache yesterday when he was at rest. Patient stated that the frontal headache came on suddenly, characterized as a pressure like sensation, without any aggravating or alleviating factors, without any radiation of the headache, and unable to give severity of the headache. Patient stated that yesterday with the headache he also had dizziness and described it as the room is spinning. Patient stated that nothing would aggravate the dizziness and would just occur if he is walking or sitting. Patient stated that the dizziness would last about 10 minutes and then self resolve. Patient stated that he has never had headache or dizziness in the past and this concerned his family and he was brought to the ER. Patient stated that when he developed the headache his daughter took his blood pressure but he does not remember if it was high or normal. Patient denied any CP, SOB, fevers, chills, N/V/D/C, abdominal pain, dysuria, melena, hematochezia, recent travel, sick contact, pleuritic or positional chest pain.         On ED admission EKG revealed Sinus rhythm with PAC at a rate of 94 with QTc of 455, CE x 1: Trop: 15, Gluc: 118, Alk Phos: 161. CT angiography neck: No hemodynamically significant stenosis. No evidence of vertebral basilar insufficiency. CT angiography brain: The [place] of [place] is patent. 4 mm sessile left supraclinoid aneurysm. In the ED patient received IV Reglan and PO Meclizine. When examined patient is resting in the stretcher and denied any current headaches or dizziness. (07 Mar 2019 04:01)        On admission: HPI:    74 y/o M with PMH of HTN presented with the complaint of headache and dizziness since yesterday. As per the patient he developed frontal headache yesterday when he was at rest. Patient stated that the frontal headache came on suddenly, characterized as a pressure like sensation, without any aggravating or alleviating factors, without any radiation of the headache, and unable to give severity of the headache. Patient stated that yesterday with the headache he also had dizziness and described it as the room is spinning. Patient stated that nothing would aggravate the dizziness and would just occur if he is walking or sitting. Patient stated that the dizziness would last about 10 minutes and then self resolve. Patient stated that he has never had headache or dizziness in the past and this concerned his family and he was brought to the ER. Patient stated that when he developed the headache his daughter took his blood pressure but he does not remember if it was high or normal. Patient denied any CP, SOB, fevers, chills, N/V/D/C, abdominal pain, dysuria, melena, hematochezia, recent travel, sick contact, pleuritic or positional chest pain.         On ED admission EKG revealed Sinus rhythm with PAC at a rate of 94 with QTc of 455, CE x 1: Trop: 15, Gluc: 118, Alk Phos: 161. CT angiography neck: No hemodynamically significant stenosis. No evidence of vertebral basilar insufficiency. CT angiography brain: The [place] of [place] is patent. 4 mm sessile left supraclinoid aneurysm. In the ED patient received IV Reglan and PO Meclizine. When examined patient is resting in the stretcher and denied any current headaches or dizziness. (07 Mar 2019 04:01)        On admission:    EKG: NSR at 94 bpm with PACs, QTc 455    CE x2: Trop 15-->25    3/7 CT angio head/neck: No hemodynamically significant stenosis. No evidence of vertebral basilar insufficiency. The [place] of [place] is patent. 4 mm sessile left supraclinoid aneurysm.    3/8 MRI brain: Moderate microvascular type white matter changes.No evidence of acute cerebral ischemia or other acute intracranial     abnormality.    HgA1C: 5.9 (preDM)         Evaluated by neuro: MRI reviewed - no acute findings    No further inpatient neurologic workup recommended    -Vertigo likely peripheral, now resolving.     -c/w supportive treatment    -vestibular therapy outpatient if needed     -c/w secondary stroke prevention w/ risk factor control.    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     -no intervention at this time, observe for now.         Evaluated by medicine: 74 y/o M with PMH of HTN presented with the complaint of headache and dizziness since yesterday. R/o CVA      -Vertigo: Differential include BPPV (patient with resolution of symptoms after getting Meclizine) vs possible vertebrobasilar CVA. Monitor on telemetry to r/o any underline arrhythmia     MRI head w/o ordered negative for CVA. No need to check echo as per neuro. Started on Aspirin 81mg and Lipitor 40mg QHS daily. PT/OT: no needs.  Patient able to tolerate regular diet. No need for S&S evaluation as per attending MD.  Patient on meclizine prn dizziness.     -Essential hypertension:  HCTZ was held in setting of ruling out CVA.  MRI negative for CVA.  HCTZ resumed on discharge.         Patient cleared for discharge home with outpatient f/u with PCP & neuro. HPI:    74 y/o M with PMH of HTN presented with the complaint of headache and dizziness since yesterday. As per the patient he developed frontal headache yesterday when he was at rest. Patient stated that the frontal headache came on suddenly, characterized as a pressure like sensation, without any aggravating or alleviating factors, without any radiation of the headache, and unable to give severity of the headache. Patient stated that yesterday with the headache he also had dizziness and described it as the room is spinning. Patient stated that nothing would aggravate the dizziness and would just occur if he is walking or sitting. Patient stated that the dizziness would last about 10 minutes and then self resolve. Patient stated that he has never had headache or dizziness in the past and this concerned his family and he was brought to the ER. Patient stated that when he developed the headache his daughter took his blood pressure but he does not remember if it was high or normal. Patient denied any CP, SOB, fevers, chills, N/V/D/C, abdominal pain, dysuria, melena, hematochezia, recent travel, sick contact, pleuritic or positional chest pain.         On ED admission EKG revealed Sinus rhythm with PAC at a rate of 94 with QTc of 455, CE x 1: Trop: 15, Gluc: 118, Alk Phos: 161. CT angiography neck: No hemodynamically significant stenosis. No evidence of vertebral basilar insufficiency. CT angiography brain: The [place] of [place] is patent. 4 mm sessile left supraclinoid aneurysm. In the ED patient received IV Reglan and PO Meclizine. When examined patient is resting in the stretcher and denied any current headaches or dizziness. (07 Mar 2019 04:01)        On admission:    EKG: NSR at 94 bpm with PACs, QTc 455    CE x2: Trop 15-->25    3/7 CT angio head/neck: No hemodynamically significant stenosis. No evidence of vertebral basilar insufficiency. The [place] of [place] is patent. 4 mm sessile left supraclinoid aneurysm.    3/8 MRI brain: Moderate microvascular type white matter changes.No evidence of acute cerebral ischemia or other acute intracranial     abnormality.    HgA1C: 5.9 (preDM)     3/8 Echo: 1. Mitral annular calcification, otherwise normal mitral    valve. Minimal mitral regurgitation.    2. Normal left ventricular internal dimensions and wall    thicknesses.    3. Normal left ventricular systolic function. No segmental    wall motion abnormalities.    4. Mild diastolic dysfunction (Stage I).    5. Normal right ventricular size and function.        Evaluated by neuro: MRI reviewed - no acute findings    No further inpatient neurologic workup recommended    -Vertigo likely peripheral, now resolving.     -c/w supportive treatment    -vestibular therapy outpatient if needed     -c/w secondary stroke prevention w/ risk factor control.    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     -no intervention at this time, observe for now.         Evaluated by medicine: 74 y/o M with PMH of HTN presented with the complaint of headache and dizziness since yesterday. R/o CVA      -Vertigo: Differential include BPPV (patient with resolution of symptoms after getting Meclizine) vs possible vertebrobasilar CVA. Monitor on telemetry to r/o any underline arrhythmia     MRI head w/o ordered negative for CVA. Started on Aspirin 81mg and Lipitor 40mg QHS daily. PT/OT: no needs.  Patient able to tolerate regular diet. No need for S&S evaluation as per attending MD.  Patient on meclizine prn dizziness.     -Essential hypertension:  HCTZ was held in setting of ruling out CVA.  MRI negative for CVA.  HCTZ resumed on discharge.         Patient cleared for discharge home with outpatient f/u with PCP & neuro.

## 2019-03-08 NOTE — DIETITIAN INITIAL EVALUATION ADULT. - NS AS NUTRI INTERV COLLABORAT
Recommend Speech and swallow evaluation--> patient reported he is able to tolerate regular consistency/texture items.

## 2019-03-08 NOTE — DIETITIAN INITIAL EVALUATION ADULT. - PROBLEM SELECTOR PLAN 1
Differential include BPPV (patient with resolution of symptoms after getting Meclizine) vs possible vertebrobasilar CVA  Monitor on telemetry to r/o any underline arrhythmia   HgbA1C, TSH, lipid profile, CBC, CMP in am   House neurology following and awaiting their recommendation   MRI head w/o ordered   TTE with bubble study ordered   Started on Aspirin 81mg and Lipitor 40mg QHS daily for now   Seizure, fall and aspiration precautions   Elevate head of the bed to 30 degree  PT/OT ordered   Speech and swallow, placed on dysphagia puree diet   Neuro checks q4hrs

## 2019-03-08 NOTE — DISCHARGE NOTE PROVIDER - NSFOLLOWUPCLINICS_GEN_ALL_ED_FT
[facility] General Internal Medicine  General Internal Medicine  [address] [id number]  Phone: [phone]  Fax:   Follow Up Time:

## 2019-03-08 NOTE — DIETITIAN INITIAL EVALUATION ADULT. - ENERGY NEEDS
Ht:69 inches Wt: 140 pounds BMI: 20.6 kg/m2 IBW: 160 pounds (+/-10%) %IBW: 87%  Edema: no edema noted.  Skin: intact, no pressure injuries noted

## 2019-03-08 NOTE — DIETITIAN INITIAL EVALUATION ADULT. - OTHER INFO
Patient seen for nutrition consult for Nutrition Services- assessment. Per chart: Patient with history of HTN- presents with headache and dizziness. Patient reported fair PO intake- consuming less than 50% of meals. Patient currently on dysphagia 1 pureed- honey consistency. Patient reported he does not like the consistency and believes he can tolerate regular texture food/fluids. Would recommend Speech and Swallow evaluation to determine consistency/texture. Patient denies any difficulty chewing or swallowing at this time. Patient denies any nausea/vomiting/diarrhea/constipation. Patient reports no food allergies or intolerances. Denies any recent weight change. Current weight: 140 pounds. No edema noted in chart.

## 2019-03-08 NOTE — DISCHARGE NOTE PROVIDER - NSDCFUADDINST_GEN_ALL_CORE_FT
Follow up with neurology within 1 month; call for appointment.  Follow up in the medicine clinic at [facility] within 1-2 weeks; call for appointment.

## 2019-03-08 NOTE — DIETITIAN INITIAL EVALUATION ADULT. - NS AS NUTRI INTERV MEALS SNACK
Continue with DASH diet + consistent carbohydrate diet- defer consistency and texture to speech and swallow.

## 2019-12-17 NOTE — PATIENT PROFILE ADULT - HAS THE PATIENT RECEIVED THE INFLUENZA VACCINE THIS SEASON?
[No studies available for review at this time.] : No studies available for review at this time. no...

## 2019-12-30 NOTE — H&P ADULT - ASSESSMENT
Gave Flores the verbal ok for OT orders.   
Provider Call: General  Route to LPN    Reason for call: Home Care needs OT orders for 2 x a wk x 3 wks     Call back needed? Yes    Return Call Needed  Same as documented in contacts section  When to return call?: Greater than one day: Route standard priority  
74 y/o M with PMH of HTN presented with the complaint of headache and dizziness since yesterday. R/o CVA

## 2021-12-03 NOTE — OCCUPATIONAL THERAPY INITIAL EVALUATION ADULT - LEVEL OF INDEPENDENCE: DRESS UPPER BODY, OT EVAL
Amiodarone needs to be filled under Dr. Grimaldo.
Patient needs a refill on AMIODarone (PACERONE) 200 MG tablet. Please send it [facility] on [address]  
independent

## 2022-07-05 NOTE — ED PROVIDER NOTE - DISPOSITION TYPE
[facility]    HOME HEALTH ORDERS  FACE TO FACE ENCOUNTER    Patient Name: Yvette Crews   YOB: 1949     PCP: Sally Green MD   PCP Address: [address] / [place] [address]   PCP Phone Number: [phone]   PCP Fax: [phone]     Encounter Date: 07/05/2022     Discharging Team: [facility]    Admit to Home Health    Diagnoses:  Active Hospital Problems    Diagnosis  POA    *Open displaced fracture of acromial end of left clavicle [S42.032B]  Yes     Priority: 1 - High    Late onset Alzheimer's dementia without behavioral disturbance [G30.1, F02.80]  Yes     Priority: 2      Chronic    Paroxysmal atrial fibrillation [I48.0]  Yes     Priority: 3      Chronic    Postoperative hypothyroidism [E89.0]  Yes     Priority: 5      Chronic    Reflux esophagitis [K21.00]  Yes     Priority: 6     Frequent falls [R29.6]  Not Applicable     Priority: 7     Chronic sinusitis [J32.9]  Yes     Priority: 8     Underweight [R63.6]  Yes     Priority: 9     Fracture of left clavicle [S42.002A]  Yes      Resolved Hospital Problems   No resolved problems to display.        Future Appointments   Date Time Provider Department Center   7/6/2022  9:00 AM Reza Traylor NP [facility] [place]   7/7/2022  9:30 AM Alcides Tamez MD [facility] ORTHO [address]   7/19/2022  9:30 AM INFUSION, CHAIR 9 [facility] AMB INF [facility]   7/28/2022  9:30 AM INFUSION, CHAIR 2 [facility]   9/14/2022  9:40 AM Sally Green MD [facility] [address]   10/6/2022  2:00 PM Marilee Lloyd, OD [facility] [address] [address]        My clinical findings that support the need for the home health skilled services and home bound status are the following:  Weakness/numbness causing balance and gait disturbance due to Fracture making it taxing to leave home.  Requiring assistive device to leave home due to unsteady gait caused by  Fracture.    Allergies:   Review of patient's allergies indicates:   Allergen  Reactions    Adhesive      Tape tears skin    Buspar [buspirone]      headaches    Escitalopram oxalate Nausea Only     hyponatremia      Rifampin Rash        Diet: regular diet    Activities: activity as tolerated  Non weight bearing to left upper extremity    Nursing:   SN to complete comprehensive assessment including routine vital signs. Instruct on disease process and s/s of complications to report to MD. Review/verify medication list sent home with the patient at time of discharge  and instruct patient/caregiver as needed. Frequency may be adjusted depending on start of care date.    Notify MD if SBP > 160 or < 90; DBP > 90 or < 50; HR > 120 or < 50; Temp > 101    Ok to schedule additional visits based on staff availability and patient request on consecutive days within the home health episode.     When multiple disciplines ordered:     Start of Care occurs on Sunday - Wednesday schedule remaining discipline evaluations as ordered on separate consecutive days following the start of care.     Thursday SOC -schedule subsequent evaluations Friday and Monday the following week.      Friday - Saturday SOC - schedule subsequent discipline evaluations on consecutive days starting Monday of the following week.     For all post-discharge communication and subsequent orders please contact patient's primary care physician. If unable to reach primary care physician or do not receive response within 30 minutes, please contact [facility] Nurse for clinical staff order clarification.    CONSULTS:    Physical Therapy to evaluate and treat. Evaluate for home safety and equipment needs; Establish/upgrade home exercise program. Perform / instruct on therapeutic exercises, gait training, transfer training, and Range of Motion.  Occupational Therapy to evaluate and treat. Evaluate home environment for safety and equipment needs. Perform/Instruct on transfers, ADL training, ROM, and therapeutic exercises.      Medications:  Review discharge medications with patient and family and provide education.         Medication List      START taking these medications    acetaminophen 325 MG tablet  Commonly known as: TYLENOL  Take 2 tablets (650 mg total) by mouth every 6 (six) hours as needed for Pain.     methocarbamoL 500 MG Tab  Commonly known as: ROBAXIN  Take 1 tablet (500 mg total) by mouth 3 (three) times daily as needed (Musce spasms).     sulfamethoxazole-trimethoprim 800-160mg 800-160 mg Tab  Commonly known as: BACTRIM DS  Take 1 tablet by mouth 2 (two) times daily. for 7 days        CHANGE how you take these medications    aspirin 81 MG EC tablet  Commonly known as: ECOTRIN  Take 1 tablet (81 mg total) by mouth once daily.  What changed: additional instructions     guaiFENesin 600 mg 12 hr tablet  Commonly known as: MUCINEX  Take 2 tablets (1,200 mg total) by mouth 2 (two) times daily.  What changed: when to take this        CONTINUE taking these medications    albuterol 90 mcg/actuation inhaler  Commonly known as: PROVENTIL/VENTOLIN HFA  Inhale 2 puffs into the lungs every 6 (six) hours as needed for Wheezing. Rescue     AZO BLADDER CONTROL ORAL  Take 1 tablet by mouth 2 (two) times a day.     docusate calcium 240 mg capsule  Commonly known as: SURFAK  Take 240 mg by mouth 3 (three) times daily.     donepeziL 10 MG tablet  Commonly known as: ARICEPT  Take 1 tablet (10 mg total) by mouth once daily.     famotidine 40 MG tablet  Commonly known as: PEPCID  Take 1 tablet (40 mg total) by mouth once daily.     ferrous sulfate 325 (65 FE) MG EC tablet  Take 1 tablet (325 mg total) by mouth once daily.     FLINTSTONES MULTI-VIT GUMMIES ORAL  Chew 2 gummies by mouth every day     Immune Globulin G (IGG)-PRO-IGA 10 % injection (Privigen) 10 % Soln  Commonly known as: PRIVIGEN  Infuse 20 g into the vein every 4 weeks.     levothyroxine 88 MCG tablet  Commonly known as: SYNTHROID  Take 1 tablet (88 mcg total) by mouth before breakfast.      loratadine 10 mg tablet  Commonly known as: CLARITIN  Take 1 tablet (10 mg total) by mouth once daily.     montelukast 10 mg tablet  Commonly known as: SINGULAIR  Take 1 tablet (10 mg total) by mouth every evening.     polyethylene glycol 17 gram Pwpk  Commonly known as: GLYCOLAX  Take 17 g by mouth once daily.     VIACTIV 650 mg-12.5 mcg-40 mcg Chew  Generic drug: calcium-vitamin D3-vitamin K  Chew 1 by mouth twice daily        STOP taking these medications    clopidogreL 75 mg tablet  Commonly known as: PLAVIX     sars-cov-2 (covid-19) 100 mcg/0.5 ml injection  Commonly known as: MODERNA COVID-19     XARELTO 20 mg Tab  Generic drug: rivaroxaban              I certify that this patient is confined to her home and needs physical therapy and occupational therapy.    _________________________________  Coral Chavis MD  07/05/2022       ADMIT

## 2023-01-03 NOTE — PHYSICAL THERAPY INITIAL EVALUATION ADULT - GAIT DISTANCE, PT EVAL
100 feet Otolaryngologist Procedure Text (A): After obtaining clear surgical margins the patient was sent to otolaryngology for surgical repair.  The patient understands they will receive post-surgical care and follow-up from the referring physician's office.

## 2024-01-02 NOTE — PATIENT PROFILE ADULT - ARE SIGNIFICANT INDICATORS COMPLETE.
January 2, 2024             Dear Ms. Krystina Washington,    Welcome to [facility] Complex [facility].  It was a pleasure talking with you today.  My name is Fernanda Melgar, and I look forward to being your Care Manager.  My goal is to help you function at the healthiest and highest level possible.  You can contact me directly at [phone].    As an [facility] patient, some of the services we may be able to provide include:     Development of an individualized care plan with a Registered Nurse   Connection with a   Connection with available resources and services    Coordinate communication among your care team members   Provide coaching and education   Help you understand your doctors treatment plan  Help you obtain information about your insurance coverage.     All services provided by [facility] Complex Care Managers and other care team members are coordinated with and communicated to your primary care team.      As part of your enrollment, you will be receiving education materials and more information about these services in your [facility] account, by phone or through the mail.  If you do not wish to participate or receive information, please contact our office at [phone].      Sincerely,        Fernanda Melgar, RN  [facility]   Out-patient RN Complex Care Manager       Patient Education        Transient Ischemic Attack   The Basics   Written by the doctors and editors at [facility]   What is a transient ischemic attack? -- A transient ischemic attack (TIA) is like a stroke in that it causes the same symptoms as a stroke, but it does not damage the brain. TIAs happen when an artery in the brain gets clogged, or closes off, and then reopens on its own. This can happen if a blood clot forms and then moves away or dissolves.  The symptoms of a TIA are the same as the symptoms of a stroke and can include:  Weakness or numbness of the hand, tongue, cheek, face, arm, or  "leg  Trouble speaking normally or at all  Trouble seeing clearly with 1 or both eyes  With a stroke, the symptoms are long-lasting, while a TIA goes away quickly.  What is the difference between TIA and stroke? -- A TIA does not cause permanent damage to the brain like a stroke does. But the symptoms are the same. This can make it hard to tell if a person is having a TIA or a stroke.  What causes a TIA? -- Just like a stroke, a TIA can happen when the blood supply to part of the brain is cut off for a short time. This can happen if a blood clot blocks the flow of blood through an artery in the brain and then dissolves or moves away. It can also happen if one of the small arteries in the brain begins to close off from the effects of high blood pressure.  How can you tell if someone is having a TIA? -- The symptoms of a TIA are the same as the symptoms of a stroke. The symptoms usually come on suddenly. There is an easy way to remember the signs of a stroke, and what to do about it. Just think of the word "FAST" (figure 1). Each letter in the word stands for 1 of the signs you should watch for:  Face - Does the person's face look uneven or droop on 1 side?  Arm - Does the person have weakness or numbness in 1 or both arms? Does 1 arm drift down if the person tries to hold both arms out?  Speech - Is the person having trouble speaking? Does his or her speech sound strange?  Time - If you notice any of these stroke signs, even if they go away, call for an ambulance (in the [place] and [place], dial 9-1-1). You need to act FAST. The sooner treatment begins, the better the chances of recovery.  Other symptoms can also be signs of a stroke. These include trouble seeing in one or both eyes, trouble walking, and loss of balance or coordination.  In the hospital, doctors can do tests to look for problems in the brain, blood vessels, and heart. This can help them understand the person's risk of having a stroke and choose the right " "treatment.  What is the risk of stroke after TIA? -- A person who has had a TIA is at high risk of having a stroke. This risk is highest in the first few days after the TIA. That is why it is so important to get medical attention right away if you think you (or someone else) might have had a TIA.  How is a TIA treated? -- TIAs are not usually treated directly. Instead, treatments are directed at reducing the risk that a person will go on to have a full-blown stroke. To lower your risk of stroke, you should:  Take your medicines exactly as directed. Medicines that are especially important in preventing strokes include:  Medicines to lower blood pressure  Medicines called statins, which lower cholesterol  Medicines to prevent blood clots, such aspirin or blood thinners  Medicines that help to keep your blood sugar as close to normal as possible (if you have diabetes)  Make lifestyle changes:  Stop smoking, if you smoke  Get regular exercise (if your doctor says it's safe) for at least 30 minutes a day on most days of the week  Lose weight, if you are overweight  Eat a diet rich in fruits, vegetables, and low-fat dairy products, and low in meats, sweets, and refined grains (such as white bread or white rice)  Eat less salt (sodium)  Limit the amount of alcohol you drink  -If you are a woman, do not drink more than 1 drink a day  -If you are a man, do not drink more than 2 drinks a day  Another way to prevent strokes is to have surgery or a procedure to reopen clogged arteries in the neck. This type of treatment is appropriate for only a small group of people.  All topics are updated as new evidence becomes available and our peer review process is complete.  This topic retrieved from [employer] on: Sep 21, 2021.  Topic 86238 Version 10.0  Release: 29.4.2 - C29.263  © 2021 UpToDate, Inc. and/or its affiliates. All rights reserved.  figure 1: Signs of stroke     The letters in the word "FAST" help you remember the signs of " "stroke. Some experts use "BE-FAST." This adds Balance (trouble standing or walking) and Eyes (problems with vision) to the list above.  If a person shows any of these signs, call for an ambulance right away. In the [place] and [place], dial 9-1-1.    Graphic 67404 Version 5.0     Consumer Information Use and Disclaimer   This information is not specific medical advice and does not replace information you receive from your health care provider. This is only a brief summary of general information. It does NOT include all information about conditions, illnesses, injuries, tests, procedures, treatments, therapies, discharge instructions or life-style choices that may apply to you. You must talk with your health care provider for complete information about your health and treatment options. This information should not be used to decide whether or not to accept your health care provider's advice, instructions or recommendations. Only your health care provider has the knowledge and training to provide advice that is right for you. The use of this information is governed by the [employer] End User License Agreement, available at https://www.7signal Solutions/en/solutions/WorkFlex [employer]/about/chelsey.The use of [employer] content is governed by the [employer] Terms of Use. ©2021 UpToDate, Inc. All rights reserved.  Copyright   © 2021 UpToDate, Inc. and/or its affiliates. All rights reserved.     Patient Education        Lowering the Risk of Having Another Stroke   The Basics   Written by the doctors and editors at [facility]   How can I keep from having another stroke? -- If you had an ischemic stroke - a stroke caused by a blocked artery in the brain - medicines and lifestyle changes can help to lower the chances of having another stroke. If you had a transient ischemic attack or "TIA," these same things can help prevent a full-blown stroke.  Medicines and lifestyle changes work together to give the most benefit. It's very important that you take " "all the medicines your doctor prescribes. It's just as important to make the lifestyle changes your doctor recommends.  Medicines -- If you had a stroke or TIA, your doctor or nurse will prescribe medicines to lower your risk of having another stroke. Some of these medicines work by "lowering your risk factors." That means that they help lower blood pressure, blood sugar and cholesterol. Other medicines help by keeping blood clots from forming. (Blood clots cause many strokes.)  Medicines that are especially important in preventing strokes include:  Medicines to lower blood pressure  Medicines to lower LDL ("bad") cholesterol, such as statins  Medicines to prevent blood clots, such aspirin or blood thinners  Medicines that help to keep your blood sugar as close to normal as possible (if you have diabetes)  Whatever medicines your doctor prescribes, make sure you take them every day as directed (table 1). If you cannot afford your medicines or if they cause side effects, talk to your doctor or nurse. There are often ways to deal with these problems.  Lifestyle changes -- Lifestyle changes can do a lot to lower your risk of stroke. That's partly because the right lifestyle choices can help control risk factors such as blood pressure, blood sugar, and cholesterol. Besides, the lifestyle changes that help lower your risk of stroke can also help prevent lots of other health problems.  Here are the most important lifestyle changes:  Stop smoking, if you smoke  Get regular exercise (if your doctor says it's safe) for at least 30 minutes a day on most days of the week  Lose weight, if you are overweight  Eat a "Mediterranean" diet rich in fruits, vegetables, and low-fat dairy products, and low in meats, sweets, and refined grains (such as white bread or white rice)  Eat less salt (sodium)  Limit the amount of alcohol you drink  If you are a woman, do not drink more than 1 drink a day  If you are a man, do not drink more " than 1 to 2 drinks a day  Help with quitting smoking -- If you smoke, ask your doctor or nurse about how to quit. There are strategies and medicines that can improve your chances of success. Studies show that people are most successful at quitting if they take medicines to help them quit and work with a counselor. You might also have a better chance at success if you combine nicotine replacement with one of the prescription medicines that help people quit.  You can also get help from a free phone line (1-155-QUIT-NOW or 1-737.227.1571) or online at www.smokefree.gov.  All topics are updated as new evidence becomes available and our peer review process is complete.  This topic retrieved from [employer] on: Sep 21, 2021.  Topic 48082 Version 9.0  Release: 29.4.2 - C29.263  © 2021 UpToDate, Inc. and/or its affiliates. All rights reserved.  table 1: Tips on taking oral medicines  Take your medicines exactly as your doctor, nurse, or pharmacist tells you to.   Use a daily or weekly pill box to organize your medicines.   Keep your medicine containers in a place you will see them every day.   Create reminders for yourself to take your medicines. Try using a calendar, a smart phone taran, a digital watch, or whatever works to help you remember.   Read the prescription label and printed patient information that comes with each medicine.   Do not skip pills, change doses, or take extra pills unless your doctor tells you to. The dose your doctor prescribes is based on your age, weight, health problems, and other medicines you take. It can be dangerous to take different amounts.   Learn the names of each of your medicines, how it works, and why you take it.   Learn what each of your medicines looks like (shape, size, color). When you get a refill, you might get a new generic version that looks different from the last time. That is OK as long as the new medicine has the same ingredient as the one it is replacing. If you are worried  about a refill looking different, talk to your pharmacist.   Tell your doctor or nurse about any side effects you have. He or she might have ways to reduce or get rid of the side effects.   Tell your doctor, nurse, or pharmacist if you can't afford your medicines. There are often ways to lower costs.   Make a list of all the medicines you take, plus any medicines you are allergic to or have had problems with in the past. Keep one copy at home and one in your wallet.   Bring a bag containing all your medicines with you to your doctor's office, every time you go. If you can't do this, bring the list of your medicines. Have your doctor or nurse go over your medicines or list with you.   Talk to your doctor, nurse, or pharmacist before you take any cough, cold, allergy, pain, or other extra medicines. The same goes for supplements and herbal medicines. Over-the-counter and herbal medicines can affect the way prescription medicines work.   Graphic 36542 Version 8.0  Consumer Information Use and Disclaimer   This information is not specific medical advice and does not replace information you receive from your health care provider. This is only a brief summary of general information. It does NOT include all information about conditions, illnesses, injuries, tests, procedures, treatments, therapies, discharge instructions or life-style choices that may apply to you. You must talk with your health care provider for complete information about your health and treatment options. This information should not be used to decide whether or not to accept your health care provider's advice, instructions or recommendations. Only your health care provider has the knowledge and training to provide advice that is right for you. The use of this information is governed by the [employer] End User License Agreement, available at https://www.YesPlz!.StockStreams/en/solutions/FeZo/about/chelsey.The use of UpToDate content is governed by the  [facility] Terms of Use. ©2021 [employer], Inc. All rights reserved.  Copyright   © 2021 [employer], Inc. and/or its affiliates. All rights reserved.   Yes

## 2024-04-05 NOTE — ED PROVIDER NOTE - PROGRESS NOTE DETAILS
[place]: CT no acute pathology. still symptomatic but improving. will admit for further w/u of [possible central etiology. neuro consult pending. no cdu beds available. resident d/w hospitalist and text paged tele pa. Initiate Treatment: Fluocinonide cream Detail Level: Zone Initiate Treatment: Ketoconazole cream Plan: Will plan to treat with destruction for the next few visits or until lesions have resolved. Detail Level: Detailed

## 2025-01-18 NOTE — PHYSICAL THERAPY INITIAL EVALUATION ADULT - AMBULATION SKILLS, REHAB EVAL
Ha Tejada  Internal Medicine  [address] [facility] [facility] [address]  [place] [address]  Phone: [phone]  Fax: [phone]  Follow Up Time: 2 weeks   independent Ha Tejada  Internal Medicine  [address], [address]  [place] [address]  Phone: [phone]  Fax: [phone]  Follow Up Time: 2 weeks    Laverne San  Neurology  [address] [place] [id number]  Phone: [phone]  Fax: [phone]  Follow Up Time: 2 weeks   Ha Tejada  Internal Medicine  [address] [facility], [address]  [place] [address]  Phone: [phone]  Fax: [phone]  Follow Up Time: 2 weeks    Laverne San  Neurology  [address] [place] [address]  Phone: [phone]  Fax: [phone]  Follow Up Time: 2 weeks    Jerry Ireland  Cardiac Electrophysiology  [address] [place] [address]  Phone: [phone]  Fax: [phone]  Follow Up Time: 1 month

## 2025-03-08 ENCOUNTER — EMERGENCY (EMERGENCY)
Facility: HOSPITAL | Age: 80
LOS: 1 days | Discharge: HOME | End: 2025-03-08
Attending: EMERGENCY MEDICINE | Admitting: EMERGENCY MEDICINE
Payer: MEDICAID

## 2025-03-08 VITALS
OXYGEN SATURATION: 98 % | RESPIRATION RATE: 17 BRPM | HEART RATE: 68 BPM | DIASTOLIC BLOOD PRESSURE: 71 MMHG | SYSTOLIC BLOOD PRESSURE: 154 MMHG | TEMPERATURE: 98 F

## 2025-03-08 VITALS
OXYGEN SATURATION: 97 % | WEIGHT: 115.96 LBS | TEMPERATURE: 98 F | RESPIRATION RATE: 17 BRPM | DIASTOLIC BLOOD PRESSURE: 73 MMHG | SYSTOLIC BLOOD PRESSURE: 175 MMHG | HEART RATE: 68 BPM

## 2025-03-08 PROBLEM — I10 ESSENTIAL (PRIMARY) HYPERTENSION: Chronic | Status: ACTIVE | Noted: 2019-03-07

## 2025-03-08 LAB
ALBUMIN SERPL ELPH-MCNC: 4.5 G/DL — SIGNIFICANT CHANGE UP (ref 3.3–5)
ALP SERPL-CCNC: 112 U/L — SIGNIFICANT CHANGE UP (ref 40–120)
ALT FLD-CCNC: 28 U/L — SIGNIFICANT CHANGE UP (ref 4–41)
ANION GAP SERPL CALC-SCNC: 13 MMOL/L — SIGNIFICANT CHANGE UP (ref 7–14)
APPEARANCE UR: CLEAR — SIGNIFICANT CHANGE UP
AST SERPL-CCNC: 29 U/L — SIGNIFICANT CHANGE UP (ref 4–40)
BASOPHILS # BLD AUTO: 0.02 K/UL — SIGNIFICANT CHANGE UP (ref 0–0.2)
BASOPHILS NFR BLD AUTO: 0.4 % — SIGNIFICANT CHANGE UP (ref 0–2)
BILIRUB SERPL-MCNC: 0.4 MG/DL — SIGNIFICANT CHANGE UP (ref 0.2–1.2)
BILIRUB UR-MCNC: NEGATIVE — SIGNIFICANT CHANGE UP
BUN SERPL-MCNC: 23 MG/DL — SIGNIFICANT CHANGE UP (ref 7–23)
CALCIUM SERPL-MCNC: 9.6 MG/DL — SIGNIFICANT CHANGE UP (ref 8.4–10.5)
CHLORIDE SERPL-SCNC: 102 MMOL/L — SIGNIFICANT CHANGE UP (ref 98–107)
CO2 SERPL-SCNC: 29 MMOL/L — SIGNIFICANT CHANGE UP (ref 22–31)
COLOR SPEC: YELLOW — SIGNIFICANT CHANGE UP
CREAT SERPL-MCNC: 0.99 MG/DL — SIGNIFICANT CHANGE UP (ref 0.5–1.3)
DIFF PNL FLD: NEGATIVE — SIGNIFICANT CHANGE UP
EGFR: 78 ML/MIN/1.73M2 — SIGNIFICANT CHANGE UP
EOSINOPHIL # BLD AUTO: 0.02 K/UL — SIGNIFICANT CHANGE UP (ref 0–0.5)
EOSINOPHIL NFR BLD AUTO: 0.4 % — SIGNIFICANT CHANGE UP (ref 0–6)
GLUCOSE SERPL-MCNC: 82 MG/DL — SIGNIFICANT CHANGE UP (ref 70–99)
GLUCOSE UR QL: NEGATIVE MG/DL — SIGNIFICANT CHANGE UP
HCT VFR BLD CALC: 45.8 % — SIGNIFICANT CHANGE UP (ref 39–50)
HGB BLD-MCNC: 15.8 G/DL — SIGNIFICANT CHANGE UP (ref 13–17)
IANC: 3.28 K/UL — SIGNIFICANT CHANGE UP (ref 1.8–7.4)
IMM GRANULOCYTES NFR BLD AUTO: 0.4 % — SIGNIFICANT CHANGE UP (ref 0–0.9)
KETONES UR-MCNC: NEGATIVE MG/DL — SIGNIFICANT CHANGE UP
LEUKOCYTE ESTERASE UR-ACNC: NEGATIVE — SIGNIFICANT CHANGE UP
LYMPHOCYTES # BLD AUTO: 1.68 K/UL — SIGNIFICANT CHANGE UP (ref 1–3.3)
LYMPHOCYTES # BLD AUTO: 30.4 % — SIGNIFICANT CHANGE UP (ref 13–44)
MCHC RBC-ENTMCNC: 31 PG — SIGNIFICANT CHANGE UP (ref 27–34)
MCHC RBC-ENTMCNC: 34.5 G/DL — SIGNIFICANT CHANGE UP (ref 32–36)
MCV RBC AUTO: 90 FL — SIGNIFICANT CHANGE UP (ref 80–100)
MONOCYTES # BLD AUTO: 0.51 K/UL — SIGNIFICANT CHANGE UP (ref 0–0.9)
MONOCYTES NFR BLD AUTO: 9.2 % — SIGNIFICANT CHANGE UP (ref 2–14)
NEUTROPHILS # BLD AUTO: 3.28 K/UL — SIGNIFICANT CHANGE UP (ref 1.8–7.4)
NEUTROPHILS NFR BLD AUTO: 59.2 % — SIGNIFICANT CHANGE UP (ref 43–77)
NITRITE UR-MCNC: NEGATIVE — SIGNIFICANT CHANGE UP
NRBC # BLD AUTO: 0 K/UL — SIGNIFICANT CHANGE UP (ref 0–0)
NRBC # FLD: 0 K/UL — SIGNIFICANT CHANGE UP (ref 0–0)
NRBC BLD AUTO-RTO: 0 /100 WBCS — SIGNIFICANT CHANGE UP (ref 0–0)
PH UR: 7.5 — SIGNIFICANT CHANGE UP (ref 5–8)
PLATELET # BLD AUTO: 172 K/UL — SIGNIFICANT CHANGE UP (ref 150–400)
POTASSIUM SERPL-MCNC: 4 MMOL/L — SIGNIFICANT CHANGE UP (ref 3.5–5.3)
POTASSIUM SERPL-SCNC: 4 MMOL/L — SIGNIFICANT CHANGE UP (ref 3.5–5.3)
PROT SERPL-MCNC: 7.4 G/DL — SIGNIFICANT CHANGE UP (ref 6–8.3)
PROT UR-MCNC: NEGATIVE MG/DL — SIGNIFICANT CHANGE UP
RBC # BLD: 5.09 M/UL — SIGNIFICANT CHANGE UP (ref 4.2–5.8)
RBC # FLD: 12.3 % — SIGNIFICANT CHANGE UP (ref 10.3–14.5)
SODIUM SERPL-SCNC: 144 MMOL/L — SIGNIFICANT CHANGE UP (ref 135–145)
SP GR SPEC: 1.02 — SIGNIFICANT CHANGE UP (ref 1–1.03)
UROBILINOGEN FLD QL: 0.2 MG/DL — SIGNIFICANT CHANGE UP (ref 0.2–1)
WBC # BLD: 5.53 K/UL — SIGNIFICANT CHANGE UP (ref 3.8–10.5)
WBC # FLD AUTO: 5.53 K/UL — SIGNIFICANT CHANGE UP (ref 3.8–10.5)

## 2025-03-08 PROCEDURE — 99284 EMERGENCY DEPT VISIT MOD MDM: CPT

## 2025-03-08 PROCEDURE — 74176 CT ABD & PELVIS W/O CONTRAST: CPT | Mod: 26

## 2025-03-08 NOTE — ED ADULT NURSE REASSESSMENT NOTE - NS ED NURSE REASSESS COMMENT FT1
Break RN: pt resting comfortably in stretcher, breathing even and unlabored. Offers no complaints at this time. Instructed to call for assistance. Stretcher in lowest position, wheels locked, appropriate side rails in place, call bell in reach. Pending dispo

## 2025-03-08 NOTE — ED ADULT TRIAGE NOTE - CHIEF COMPLAINT QUOTE
pt ambulatory to triage, pt c/o hematuria starting this morning. denies blood thinners and trauma. PMhx of prostate CA in 2019

## 2025-03-08 NOTE — ED ADULT TRIAGE NOTE - NSWEIGHTCALCTOOLDRUG_GEN_A_CORE
used Cephalexin Pregnancy And Lactation Text: This medication is Pregnancy Category B and considered safe during pregnancy.  It is also excreted in breast milk but can be used safely for shorter doses.

## 2025-03-08 NOTE — ED ADULT NURSE NOTE - OBJECTIVE STATEMENT
Pt A&Ox4, ambulatory to ED c/o hematuria since yesterday morning. Denies dysuria, n/v/diarrhea, sob, dizziness. endorses 3/10 chest pain and left flank pain when he first got into his room but since subsided. denies blood thinner use and trauma. hx of prostate Ca, HTN, HLD. respirations even and unlabored. NSR on monitor. no CVA tenderness presents. abd soft and nondistended. 20g IV LAC. labs drawn and sent. plan of care continued.

## 2025-03-08 NOTE — ED PROVIDER NOTE - OBJECTIVE STATEMENT
Patient is a 79-year-old male with a past medical history of hypertension, prostate cancer who presents emergency department complaining of hematuria that started this morning.  Patient denies any use of blood thinners.  Denies any trauma to the area.  Patient denies any burning with urination, kidney stones.  Patient states he is also having mild back pain.  Denies any fevers, chills.  Chest pressure.  Denies any lightheadedness or dizziness.

## 2025-03-08 NOTE — ED ADULT NURSE NOTE - NSFALLUNIVINTERV_ED_ALL_ED
Bed/Stretcher in lowest position, wheels locked, appropriate side rails in place/Call bell, personal items and telephone in reach/Instruct patient to call for assistance before getting out of bed/chair/stretcher/Non-slip footwear applied when patient is off stretcher/[place] to call system/Physically safe environment - no spills, clutter or unnecessary equipment/Purposeful proactive rounding/Room/bathroom lighting operational, light cord in reach

## 2025-03-08 NOTE — ED PROVIDER NOTE - ATTENDING CONTRIBUTION TO CARE
Mr. Moffett is coming in with hematuria and some back pain that started today.  He has a history of prostate cancer.  Other past medical history includes high cholesterol hypertension.  He denies urinary burning dysuria urgency.  Denies a history of renal stones.    Medications include tamsulosin finasteride 5 mg bisoprolol hydrochlorothiazide 2.5 and 6.25 atorvastatin 20 amlodipine 5 mg    Vital signs include blood pressure 175/73 respiratory rate 17 heart rate 68 temp 98.1 O2 sat 97    Pt alert and can phonate well thin man no acute distress  h at/nc  perrl, conj clear, sclera anicteric,  neck supple  abd soft no r/g/t no suprapubic tenderness  Genitals testes both down no hernias no masses no lesions on the glans  Back no central area of tenderness it is left-sided flank pain  ext no edema no deformities  neueo awake, lucid moves all extremities with strength  psych normal affect  vs reasonable    Patient with a history of prostate cancer comes in with hematuria unfortunately strong possibility that it could be something to do with the prostate but in the meantime we will evaluate for renal stone and infection with baseline labs    I performed a history and physical exam of the patient and discussed their management with the resident and /or advanced care provider. I personally made/approved the management plan and take responsibility for the patient management. I reviewed the resident and /or ACP's note and agree with the documented findings and plan of care. My medical decison making and observations are found above.

## 2025-03-08 NOTE — ED PROVIDER NOTE - NSFOLLOWUPINSTRUCTIONS_ED_ALL_ED_FT
You came to the emergency department with hematuria.  Your results are all attached words.  Here we did not find any blood in the urine we also did not find any thing like a stone or obvious lytic lesion on your CAT scan.  You should follow-up with your urologist with the possibility that you may need another cystoscopy.    Please feel free to return to the emergency department if you again start bleeding with your urine    You are being discharged from the Emergency Department after evaluation of your presenting problem.  You were found not to have an emergency that requires hospitalization or surgery, but this does not mean you do not have a health concern.  You should follow up with your primary care physician and any other physicians suggested at time of discharge.  Also, if your condition worsens or changes know that the emergency department is open and available 24 hours a day/ 7 days a week and you should return to us if you have concerns. Thank you for allowing us to participate in your care.

## 2025-03-08 NOTE — ED PROVIDER NOTE - CLINICAL SUMMARY MEDICAL DECISION MAKING FREE TEXT BOX
Patient presents emergency department complaining of hematuria.  Patient is hemodynamically stable afebrile presentation.  Patient physical labs significant for no abdominal tenderness.  The patient abdominal tenderness that is diffuse in nature and with no rebound or guarding.  Patient's lungs sound clear.  Heart sounds normal.  No lower extremity edema.  Patient has flank tenderness on exam.  Given patient presentation and history obtain lab work and imaging to evaluate for acute pathologies.

## 2025-03-08 NOTE — ED ADULT NURSE NOTE - NSFALLOOBATTEMPT_ED_ALL_ED
Medical Social Work    Referral:  Rounds    Intervention:  Pt discussed during rounds.  Still awaiting an accepting SNF. Three are still outstanding    Plan:  SW will remain available for dc planning   No

## 2025-03-08 NOTE — ED PROVIDER NOTE - PATIENT PORTAL LINK FT
You can access the FollowMyHealth Patient Portal offered by [facility] by registering at the following website: [email or web address]. By joining [employer]’s FollowMyHealth portal, you will also be able to view your health information using other applications (apps) compatible with our system.

## 2025-03-08 NOTE — ED ADULT NURSE REASSESSMENT NOTE - NS ED NURSE REASSESS COMMENT FT1
Break RN: pt resting comfortably in stretcher, breathing even and unlabored. Offers no complaints at this time. Instructed to call for assistance. Stretcher in lowest position, wheels locked, appropriate side rails in place, call bell in reach. Pending Ctr